# Patient Record
Sex: FEMALE | Race: WHITE | NOT HISPANIC OR LATINO
[De-identification: names, ages, dates, MRNs, and addresses within clinical notes are randomized per-mention and may not be internally consistent; named-entity substitution may affect disease eponyms.]

---

## 2017-02-13 ENCOUNTER — APPOINTMENT (OUTPATIENT)
Dept: SURGERY | Facility: CLINIC | Age: 57
End: 2017-02-13

## 2017-09-05 ENCOUNTER — OUTPATIENT (OUTPATIENT)
Dept: OUTPATIENT SERVICES | Facility: HOSPITAL | Age: 57
LOS: 1 days | End: 2017-09-05
Payer: COMMERCIAL

## 2017-09-05 ENCOUNTER — APPOINTMENT (OUTPATIENT)
Dept: ULTRASOUND IMAGING | Facility: IMAGING CENTER | Age: 57
End: 2017-09-05
Payer: COMMERCIAL

## 2017-09-05 ENCOUNTER — APPOINTMENT (OUTPATIENT)
Dept: MAMMOGRAPHY | Facility: IMAGING CENTER | Age: 57
End: 2017-09-05
Payer: COMMERCIAL

## 2017-09-05 DIAGNOSIS — Z00.00 ENCOUNTER FOR GENERAL ADULT MEDICAL EXAMINATION WITHOUT ABNORMAL FINDINGS: ICD-10-CM

## 2017-09-05 PROCEDURE — 76641 ULTRASOUND BREAST COMPLETE: CPT | Mod: 26,50

## 2017-09-05 PROCEDURE — 77063 BREAST TOMOSYNTHESIS BI: CPT | Mod: 26

## 2017-09-05 PROCEDURE — G0202: CPT | Mod: 26

## 2017-09-05 PROCEDURE — 76641 ULTRASOUND BREAST COMPLETE: CPT

## 2017-09-05 PROCEDURE — 77067 SCR MAMMO BI INCL CAD: CPT

## 2017-09-05 PROCEDURE — 77063 BREAST TOMOSYNTHESIS BI: CPT

## 2018-10-25 ENCOUNTER — APPOINTMENT (OUTPATIENT)
Dept: MAMMOGRAPHY | Facility: IMAGING CENTER | Age: 58
End: 2018-10-25
Payer: COMMERCIAL

## 2018-10-25 ENCOUNTER — APPOINTMENT (OUTPATIENT)
Dept: ULTRASOUND IMAGING | Facility: IMAGING CENTER | Age: 58
End: 2018-10-25
Payer: COMMERCIAL

## 2018-10-25 ENCOUNTER — OUTPATIENT (OUTPATIENT)
Dept: OUTPATIENT SERVICES | Facility: HOSPITAL | Age: 58
LOS: 1 days | End: 2018-10-25
Payer: COMMERCIAL

## 2018-10-25 DIAGNOSIS — Z00.8 ENCOUNTER FOR OTHER GENERAL EXAMINATION: ICD-10-CM

## 2018-10-25 PROCEDURE — 77067 SCR MAMMO BI INCL CAD: CPT

## 2018-10-25 PROCEDURE — 77063 BREAST TOMOSYNTHESIS BI: CPT | Mod: 26

## 2018-10-25 PROCEDURE — 77067 SCR MAMMO BI INCL CAD: CPT | Mod: 26

## 2018-10-25 PROCEDURE — 76641 ULTRASOUND BREAST COMPLETE: CPT | Mod: 26,50

## 2018-10-25 PROCEDURE — 77063 BREAST TOMOSYNTHESIS BI: CPT

## 2018-10-25 PROCEDURE — 76641 ULTRASOUND BREAST COMPLETE: CPT

## 2019-11-14 ENCOUNTER — APPOINTMENT (OUTPATIENT)
Dept: MAMMOGRAPHY | Facility: IMAGING CENTER | Age: 59
End: 2019-11-14
Payer: COMMERCIAL

## 2019-11-14 ENCOUNTER — APPOINTMENT (OUTPATIENT)
Dept: ULTRASOUND IMAGING | Facility: IMAGING CENTER | Age: 59
End: 2019-11-14
Payer: COMMERCIAL

## 2019-11-14 ENCOUNTER — APPOINTMENT (OUTPATIENT)
Dept: RADIOLOGY | Facility: IMAGING CENTER | Age: 59
End: 2019-11-14
Payer: COMMERCIAL

## 2019-11-14 ENCOUNTER — OUTPATIENT (OUTPATIENT)
Dept: OUTPATIENT SERVICES | Facility: HOSPITAL | Age: 59
LOS: 1 days | End: 2019-11-14
Payer: COMMERCIAL

## 2019-11-14 DIAGNOSIS — Z13.820 ENCOUNTER FOR SCREENING FOR OSTEOPOROSIS: ICD-10-CM

## 2019-11-14 DIAGNOSIS — R92.8 OTHER ABNORMAL AND INCONCLUSIVE FINDINGS ON DIAGNOSTIC IMAGING OF BREAST: ICD-10-CM

## 2019-11-14 PROCEDURE — 76641 ULTRASOUND BREAST COMPLETE: CPT

## 2019-11-14 PROCEDURE — 77080 DXA BONE DENSITY AXIAL: CPT | Mod: 26

## 2019-11-14 PROCEDURE — 76641 ULTRASOUND BREAST COMPLETE: CPT | Mod: 26,50

## 2019-11-14 PROCEDURE — G0279: CPT | Mod: 26

## 2019-11-14 PROCEDURE — 77066 DX MAMMO INCL CAD BI: CPT | Mod: 26

## 2019-11-14 PROCEDURE — 77066 DX MAMMO INCL CAD BI: CPT

## 2019-11-14 PROCEDURE — G0279: CPT

## 2019-11-14 PROCEDURE — 77080 DXA BONE DENSITY AXIAL: CPT

## 2020-03-16 ENCOUNTER — OUTPATIENT (OUTPATIENT)
Dept: OUTPATIENT SERVICES | Facility: HOSPITAL | Age: 60
LOS: 1 days | End: 2020-03-16
Payer: COMMERCIAL

## 2020-03-16 ENCOUNTER — APPOINTMENT (OUTPATIENT)
Dept: ULTRASOUND IMAGING | Facility: IMAGING CENTER | Age: 60
End: 2020-03-16
Payer: COMMERCIAL

## 2020-03-16 ENCOUNTER — APPOINTMENT (OUTPATIENT)
Dept: MAMMOGRAPHY | Facility: IMAGING CENTER | Age: 60
End: 2020-03-16
Payer: COMMERCIAL

## 2020-03-16 DIAGNOSIS — R92.8 OTHER ABNORMAL AND INCONCLUSIVE FINDINGS ON DIAGNOSTIC IMAGING OF BREAST: ICD-10-CM

## 2020-03-16 PROCEDURE — 77065 DX MAMMO INCL CAD UNI: CPT | Mod: 26,RT

## 2020-03-16 PROCEDURE — 77065 DX MAMMO INCL CAD UNI: CPT

## 2020-03-20 ENCOUNTER — APPOINTMENT (OUTPATIENT)
Dept: MAMMOGRAPHY | Facility: IMAGING CENTER | Age: 60
End: 2020-03-20
Payer: COMMERCIAL

## 2020-03-20 ENCOUNTER — RESULT REVIEW (OUTPATIENT)
Age: 60
End: 2020-03-20

## 2020-03-20 ENCOUNTER — OUTPATIENT (OUTPATIENT)
Dept: OUTPATIENT SERVICES | Facility: HOSPITAL | Age: 60
LOS: 1 days | End: 2020-03-20
Payer: COMMERCIAL

## 2020-03-20 DIAGNOSIS — Z00.8 ENCOUNTER FOR OTHER GENERAL EXAMINATION: ICD-10-CM

## 2020-03-20 PROCEDURE — 19081 BX BREAST 1ST LESION STRTCTC: CPT

## 2020-03-20 PROCEDURE — 88305 TISSUE EXAM BY PATHOLOGIST: CPT | Mod: 26

## 2020-03-20 PROCEDURE — 77065 DX MAMMO INCL CAD UNI: CPT | Mod: 26,RT

## 2020-03-20 PROCEDURE — A4648: CPT

## 2020-03-20 PROCEDURE — 88305 TISSUE EXAM BY PATHOLOGIST: CPT

## 2020-03-20 PROCEDURE — 19081 BX BREAST 1ST LESION STRTCTC: CPT | Mod: RT

## 2020-03-20 PROCEDURE — 77065 DX MAMMO INCL CAD UNI: CPT

## 2020-03-23 LAB — SURGICAL PATHOLOGY STUDY: SIGNIFICANT CHANGE UP

## 2020-08-02 ENCOUNTER — TRANSCRIPTION ENCOUNTER (OUTPATIENT)
Age: 60
End: 2020-08-02

## 2024-01-01 ENCOUNTER — INPATIENT (INPATIENT)
Facility: HOSPITAL | Age: 64
LOS: 0 days | End: 2024-10-24
Attending: EMERGENCY MEDICINE | Admitting: EMERGENCY MEDICINE
Payer: COMMERCIAL

## 2024-01-01 VITALS
SYSTOLIC BLOOD PRESSURE: 130 MMHG | OXYGEN SATURATION: 99 % | RESPIRATION RATE: 18 BRPM | HEART RATE: 113 BPM | TEMPERATURE: 97 F | DIASTOLIC BLOOD PRESSURE: 100 MMHG

## 2024-01-01 DIAGNOSIS — R41.82 ALTERED MENTAL STATUS, UNSPECIFIED: ICD-10-CM

## 2024-01-01 LAB
ADD ON TEST-SPECIMEN IN LAB: SIGNIFICANT CHANGE UP
ALBUMIN SERPL ELPH-MCNC: 2.1 G/DL — LOW (ref 3.3–5)
ALBUMIN SERPL ELPH-MCNC: 2.4 G/DL — LOW (ref 3.3–5)
ALP SERPL-CCNC: 158 U/L — HIGH (ref 40–120)
ALP SERPL-CCNC: 75 U/L — SIGNIFICANT CHANGE UP (ref 40–120)
ALT FLD-CCNC: 247 U/L — HIGH (ref 4–33)
ALT FLD-CCNC: 44 U/L — HIGH (ref 4–33)
ANION GAP SERPL CALC-SCNC: 21 MMOL/L — HIGH (ref 7–14)
ANION GAP SERPL CALC-SCNC: >29 MMOL/L — HIGH (ref 7–14)
ANISOCYTOSIS BLD QL: SLIGHT — SIGNIFICANT CHANGE UP
APPEARANCE UR: ABNORMAL
APTT BLD: 26.4 SEC — SIGNIFICANT CHANGE UP (ref 24.5–35.6)
AST SERPL-CCNC: 1032 U/L — HIGH (ref 4–32)
AST SERPL-CCNC: 135 U/L — HIGH (ref 4–32)
BACTERIA # UR AUTO: ABNORMAL /HPF
BASOPHILS # BLD AUTO: 0 K/UL — SIGNIFICANT CHANGE UP (ref 0–0.2)
BASOPHILS # BLD AUTO: 0.05 K/UL — SIGNIFICANT CHANGE UP (ref 0–0.2)
BASOPHILS NFR BLD AUTO: 0 % — SIGNIFICANT CHANGE UP (ref 0–2)
BASOPHILS NFR BLD AUTO: 0.4 % — SIGNIFICANT CHANGE UP (ref 0–2)
BILIRUB SERPL-MCNC: 1.2 MG/DL — SIGNIFICANT CHANGE UP (ref 0.2–1.2)
BILIRUB SERPL-MCNC: 1.2 MG/DL — SIGNIFICANT CHANGE UP (ref 0.2–1.2)
BILIRUB UR-MCNC: ABNORMAL
BLOOD GAS ARTERIAL COMPREHENSIVE RESULT: SIGNIFICANT CHANGE UP
BLOOD GAS VENOUS COMPREHENSIVE RESULT: SIGNIFICANT CHANGE UP
BLOOD GAS VENOUS COMPREHENSIVE RESULT: SIGNIFICANT CHANGE UP
BUN SERPL-MCNC: 70 MG/DL — HIGH (ref 7–23)
BUN SERPL-MCNC: 71 MG/DL — HIGH (ref 7–23)
BURR CELLS BLD QL SMEAR: PRESENT — SIGNIFICANT CHANGE UP
BURR CELLS BLD QL SMEAR: SLIGHT — SIGNIFICANT CHANGE UP
CALCIUM SERPL-MCNC: 7.8 MG/DL — LOW (ref 8.4–10.5)
CALCIUM SERPL-MCNC: 9 MG/DL — SIGNIFICANT CHANGE UP (ref 8.4–10.5)
CAST: 27 /LPF — HIGH (ref 0–4)
CHLORIDE SERPL-SCNC: 108 MMOL/L — HIGH (ref 98–107)
CHLORIDE SERPL-SCNC: 108 MMOL/L — HIGH (ref 98–107)
CO2 SERPL-SCNC: 14 MMOL/L — LOW (ref 22–31)
CO2 SERPL-SCNC: <7 MMOL/L — CRITICAL LOW (ref 22–31)
COLOR SPEC: SIGNIFICANT CHANGE UP
CREAT SERPL-MCNC: 2.85 MG/DL — HIGH (ref 0.5–1.3)
CREAT SERPL-MCNC: 3.03 MG/DL — HIGH (ref 0.5–1.3)
DIFF PNL FLD: ABNORMAL
EGFR: 17 ML/MIN/1.73M2 — LOW
EGFR: 18 ML/MIN/1.73M2 — LOW
EOSINOPHIL # BLD AUTO: 0 K/UL — SIGNIFICANT CHANGE UP (ref 0–0.5)
EOSINOPHIL # BLD AUTO: 0.45 K/UL — SIGNIFICANT CHANGE UP (ref 0–0.5)
EOSINOPHIL NFR BLD AUTO: 0 % — SIGNIFICANT CHANGE UP (ref 0–6)
EOSINOPHIL NFR BLD AUTO: 3.8 % — SIGNIFICANT CHANGE UP (ref 0–6)
FLUAV AG NPH QL: SIGNIFICANT CHANGE UP
FLUBV AG NPH QL: SIGNIFICANT CHANGE UP
GLUCOSE BLDC GLUCOMTR-MCNC: 154 MG/DL — HIGH (ref 70–99)
GLUCOSE SERPL-MCNC: 173 MG/DL — HIGH (ref 70–99)
GLUCOSE SERPL-MCNC: 202 MG/DL — HIGH (ref 70–99)
GLUCOSE UR QL: NEGATIVE MG/DL — SIGNIFICANT CHANGE UP
HCT VFR BLD CALC: 47.3 % — HIGH (ref 34.5–45)
HCT VFR BLD CALC: 53.9 % — HIGH (ref 34.5–45)
HGB BLD-MCNC: 14.8 G/DL — SIGNIFICANT CHANGE UP (ref 11.5–15.5)
HGB BLD-MCNC: 16.4 G/DL — HIGH (ref 11.5–15.5)
IANC: 10.03 K/UL — HIGH (ref 1.8–7.4)
IANC: 4.38 K/UL — SIGNIFICANT CHANGE UP (ref 1.8–7.4)
IMM GRANULOCYTES NFR BLD AUTO: 0.9 % — SIGNIFICANT CHANGE UP (ref 0–0.9)
INR BLD: 1.14 RATIO — SIGNIFICANT CHANGE UP (ref 0.85–1.16)
KETONES UR-MCNC: ABNORMAL MG/DL
LEUKOCYTE ESTERASE UR-ACNC: ABNORMAL
LYMPHOCYTES # BLD AUTO: 0.35 K/UL — LOW (ref 1–3.3)
LYMPHOCYTES # BLD AUTO: 0.73 K/UL — LOW (ref 1–3.3)
LYMPHOCYTES # BLD AUTO: 6.2 % — LOW (ref 13–44)
LYMPHOCYTES # BLD AUTO: 7 % — LOW (ref 13–44)
MACROCYTES BLD QL: SLIGHT — SIGNIFICANT CHANGE UP
MCHC RBC-ENTMCNC: 28 PG — SIGNIFICANT CHANGE UP (ref 27–34)
MCHC RBC-ENTMCNC: 28.5 PG — SIGNIFICANT CHANGE UP (ref 27–34)
MCHC RBC-ENTMCNC: 30.4 GM/DL — LOW (ref 32–36)
MCHC RBC-ENTMCNC: 31.3 GM/DL — LOW (ref 32–36)
MCV RBC AUTO: 91 FL — SIGNIFICANT CHANGE UP (ref 80–100)
MCV RBC AUTO: 92.1 FL — SIGNIFICANT CHANGE UP (ref 80–100)
METAMYELOCYTES # FLD: 3.5 % — HIGH (ref 0–1)
MONOCYTES # BLD AUTO: 0.31 K/UL — SIGNIFICANT CHANGE UP (ref 0–0.9)
MONOCYTES # BLD AUTO: 0.49 K/UL — SIGNIFICANT CHANGE UP (ref 0–0.9)
MONOCYTES NFR BLD AUTO: 4.1 % — SIGNIFICANT CHANGE UP (ref 2–14)
MONOCYTES NFR BLD AUTO: 6.1 % — SIGNIFICANT CHANGE UP (ref 2–14)
MYELOCYTES NFR BLD: 1.8 % — HIGH (ref 0–0)
NEUTROPHILS # BLD AUTO: 10.03 K/UL — HIGH (ref 1.8–7.4)
NEUTROPHILS # BLD AUTO: 4.14 K/UL — SIGNIFICANT CHANGE UP (ref 1.8–7.4)
NEUTROPHILS NFR BLD AUTO: 71.9 % — SIGNIFICANT CHANGE UP (ref 43–77)
NEUTROPHILS NFR BLD AUTO: 84.6 % — HIGH (ref 43–77)
NEUTS BAND # BLD: 9.7 % — HIGH (ref 0–6)
NITRITE UR-MCNC: NEGATIVE — SIGNIFICANT CHANGE UP
NRBC # BLD: 0 /100 WBCS — SIGNIFICANT CHANGE UP (ref 0–0)
NRBC # FLD: 0 K/UL — SIGNIFICANT CHANGE UP (ref 0–0)
OVALOCYTES BLD QL SMEAR: SLIGHT — SIGNIFICANT CHANGE UP
PH UR: 5 — SIGNIFICANT CHANGE UP (ref 5–8)
PLAT MORPH BLD: NORMAL — SIGNIFICANT CHANGE UP
PLATELET # BLD AUTO: 147 K/UL — LOW (ref 150–400)
PLATELET # BLD AUTO: 238 K/UL — SIGNIFICANT CHANGE UP (ref 150–400)
PLATELET COUNT - ESTIMATE: ABNORMAL
POIKILOCYTOSIS BLD QL AUTO: SIGNIFICANT CHANGE UP
POLYCHROMASIA BLD QL SMEAR: SLIGHT — SIGNIFICANT CHANGE UP
POTASSIUM SERPL-MCNC: 4 MMOL/L — SIGNIFICANT CHANGE UP (ref 3.5–5.3)
POTASSIUM SERPL-MCNC: 5.3 MMOL/L — SIGNIFICANT CHANGE UP (ref 3.5–5.3)
POTASSIUM SERPL-SCNC: 4 MMOL/L — SIGNIFICANT CHANGE UP (ref 3.5–5.3)
POTASSIUM SERPL-SCNC: 5.3 MMOL/L — SIGNIFICANT CHANGE UP (ref 3.5–5.3)
PROT SERPL-MCNC: 4.4 G/DL — LOW (ref 6–8.3)
PROT SERPL-MCNC: 5.5 G/DL — LOW (ref 6–8.3)
PROT UR-MCNC: 100 MG/DL
PROTHROM AB SERPL-ACNC: 13.6 SEC — HIGH (ref 9.9–13.4)
RBC # BLD: 5.2 M/UL — SIGNIFICANT CHANGE UP (ref 3.8–5.2)
RBC # BLD: 5.85 M/UL — HIGH (ref 3.8–5.2)
RBC # FLD: 14.1 % — SIGNIFICANT CHANGE UP (ref 10.3–14.5)
RBC # FLD: 14.1 % — SIGNIFICANT CHANGE UP (ref 10.3–14.5)
RBC BLD AUTO: ABNORMAL
RBC CASTS # UR COMP ASSIST: 10 /HPF — HIGH (ref 0–4)
REVIEW: SIGNIFICANT CHANGE UP
RSV RNA NPH QL NAA+NON-PROBE: SIGNIFICANT CHANGE UP
SARS-COV-2 RNA SPEC QL NAA+PROBE: SIGNIFICANT CHANGE UP
SMUDGE CELLS # BLD: PRESENT — SIGNIFICANT CHANGE UP
SODIUM SERPL-SCNC: 143 MMOL/L — SIGNIFICANT CHANGE UP (ref 135–145)
SODIUM SERPL-SCNC: 144 MMOL/L — SIGNIFICANT CHANGE UP (ref 135–145)
SP GR SPEC: 1.02 — SIGNIFICANT CHANGE UP (ref 1–1.03)
SQUAMOUS # UR AUTO: 57 /HPF — HIGH (ref 0–5)
TROPONIN T, HIGH SENSITIVITY RESULT: 71 NG/L — CRITICAL HIGH
TROPONIN T, HIGH SENSITIVITY RESULT: 89 NG/L — CRITICAL HIGH
UROBILINOGEN FLD QL: 1 MG/DL — SIGNIFICANT CHANGE UP (ref 0.2–1)
WBC # BLD: 11.86 K/UL — HIGH (ref 3.8–10.5)
WBC # BLD: 5.07 K/UL — SIGNIFICANT CHANGE UP (ref 3.8–10.5)
WBC # FLD AUTO: 11.86 K/UL — HIGH (ref 3.8–10.5)
WBC # FLD AUTO: 5.07 K/UL — SIGNIFICANT CHANGE UP (ref 3.8–10.5)
WBC UR QL: 5 /HPF — SIGNIFICANT CHANGE UP (ref 0–5)

## 2024-01-01 PROCEDURE — 99292 CRITICAL CARE ADDL 30 MIN: CPT | Mod: GC

## 2024-01-01 PROCEDURE — 74176 CT ABD & PELVIS W/O CONTRAST: CPT | Mod: 26,MC

## 2024-01-01 PROCEDURE — 70450 CT HEAD/BRAIN W/O DYE: CPT | Mod: 26,MC

## 2024-01-01 PROCEDURE — 99285 EMERGENCY DEPT VISIT HI MDM: CPT

## 2024-01-01 PROCEDURE — 71045 X-RAY EXAM CHEST 1 VIEW: CPT | Mod: 26

## 2024-01-01 PROCEDURE — 99291 CRITICAL CARE FIRST HOUR: CPT | Mod: GC

## 2024-01-01 RX ORDER — PIPERACILLIN AND TAZOBACTAM .5; 4 G/20ML; G/20ML
3.38 INJECTION, POWDER, LYOPHILIZED, FOR SOLUTION INTRAVENOUS EVERY 12 HOURS
Refills: 0 | Status: DISCONTINUED | OUTPATIENT
Start: 2024-01-01 | End: 2024-01-01

## 2024-01-01 RX ORDER — CHLORHEXIDINE GLUCONATE 40 MG/ML
15 SOLUTION TOPICAL EVERY 12 HOURS
Refills: 0 | Status: DISCONTINUED | OUTPATIENT
Start: 2024-01-01 | End: 2024-01-01

## 2024-01-01 RX ORDER — ACETAMINOPHEN 500 MG
1000 TABLET ORAL ONCE
Refills: 0 | Status: COMPLETED | OUTPATIENT
Start: 2024-01-01 | End: 2024-01-01

## 2024-01-01 RX ORDER — VANCOMYCIN HYDROCHLORIDE 50 MG/ML
1000 KIT ORAL ONCE
Refills: 0 | Status: DISCONTINUED | OUTPATIENT
Start: 2024-01-01 | End: 2024-01-01

## 2024-01-01 RX ORDER — LORAZEPAM 2 MG
0.5 TABLET ORAL EVERY 4 HOURS
Refills: 0 | Status: DISCONTINUED | OUTPATIENT
Start: 2024-01-01 | End: 2024-01-01

## 2024-01-01 RX ORDER — CHLORHEXIDINE GLUCONATE 40 MG/ML
1 SOLUTION TOPICAL
Refills: 0 | Status: DISCONTINUED | OUTPATIENT
Start: 2024-01-01 | End: 2024-01-01

## 2024-01-01 RX ORDER — PROPOFOL 10 MG/ML
15 INJECTION, EMULSION INTRAVENOUS
Qty: 1000 | Refills: 0 | Status: DISCONTINUED | OUTPATIENT
Start: 2024-01-01 | End: 2024-01-01

## 2024-01-01 RX ORDER — HYDROMORPHONE HCL/0.9% NACL/PF 6 MG/30 ML
0.5 PATIENT CONTROLLED ANALGESIA SYRINGE INTRAVENOUS
Refills: 0 | Status: DISCONTINUED | OUTPATIENT
Start: 2024-01-01 | End: 2024-01-01

## 2024-01-01 RX ORDER — ROCURONIUM BROMIDE 50 MG/5 ML
70 SYRINGE (ML) INTRAVENOUS ONCE
Refills: 0 | Status: COMPLETED | OUTPATIENT
Start: 2024-01-01 | End: 2024-01-01

## 2024-01-01 RX ORDER — NOREPINEPHRINE BITARTRATE 1 MG/ML
0.05 INJECTION, SOLUTION, CONCENTRATE INTRAVENOUS
Qty: 8 | Refills: 0 | Status: DISCONTINUED | OUTPATIENT
Start: 2024-01-01 | End: 2024-01-01

## 2024-01-01 RX ORDER — ETOMIDATE 2 MG/ML
20 INJECTION, SOLUTION INTRAVENOUS ONCE
Refills: 0 | Status: COMPLETED | OUTPATIENT
Start: 2024-01-01 | End: 2024-01-01

## 2024-01-01 RX ORDER — VANCOMYCIN HYDROCHLORIDE 50 MG/ML
1000 KIT ORAL ONCE
Refills: 0 | Status: COMPLETED | OUTPATIENT
Start: 2024-01-01 | End: 2024-01-01

## 2024-01-01 RX ORDER — SODIUM BICARBONATE 1 MEQ/ML
50 VIAL (ML) INTRAVENOUS ONCE
Refills: 0 | Status: COMPLETED | OUTPATIENT
Start: 2024-01-01 | End: 2024-01-01

## 2024-01-01 RX ORDER — PHENYLEPHRINE HCL IN 0.9% NACL 20MG/250ML
300 PLASTIC BAG, INJECTION (ML) INTRAVENOUS ONCE
Refills: 0 | Status: COMPLETED | OUTPATIENT
Start: 2024-01-01 | End: 2024-01-01

## 2024-01-01 RX ORDER — PIPERACILLIN AND TAZOBACTAM .5; 4 G/20ML; G/20ML
3.38 INJECTION, POWDER, LYOPHILIZED, FOR SOLUTION INTRAVENOUS ONCE
Refills: 0 | Status: COMPLETED | OUTPATIENT
Start: 2024-01-01 | End: 2024-01-01

## 2024-01-01 RX ORDER — NOREPINEPHRINE BITARTRATE 1 MG/ML
0.1 INJECTION, SOLUTION, CONCENTRATE INTRAVENOUS
Qty: 8 | Refills: 0 | Status: DISCONTINUED | OUTPATIENT
Start: 2024-01-01 | End: 2024-01-01

## 2024-01-01 RX ORDER — SODIUM CHLORIDE 9 MG/ML
1000 INJECTION, SOLUTION INTRAMUSCULAR; INTRAVENOUS; SUBCUTANEOUS ONCE
Refills: 0 | Status: DISCONTINUED | OUTPATIENT
Start: 2024-01-01 | End: 2024-01-01

## 2024-01-01 RX ORDER — SODIUM CHLORIDE 9 MG/ML
1000 INJECTION, SOLUTION INTRAMUSCULAR; INTRAVENOUS; SUBCUTANEOUS ONCE
Refills: 0 | Status: COMPLETED | OUTPATIENT
Start: 2024-01-01 | End: 2024-01-01

## 2024-01-01 RX ADMIN — Medication 1000 MILLIGRAM(S): at 13:21

## 2024-01-01 RX ADMIN — Medication 1000 MILLILITER(S): at 21:45

## 2024-01-01 RX ADMIN — Medication 300 MICROGRAM(S): at 16:30

## 2024-01-01 RX ADMIN — PIPERACILLIN AND TAZOBACTAM 25 GRAM(S): .5; 4 INJECTION, POWDER, LYOPHILIZED, FOR SOLUTION INTRAVENOUS at 23:02

## 2024-01-01 RX ADMIN — Medication 1000 MILLILITER(S): at 16:30

## 2024-01-01 RX ADMIN — Medication 70 MILLIGRAM(S): at 16:30

## 2024-01-01 RX ADMIN — Medication 1000 MILLILITER(S): at 17:20

## 2024-01-01 RX ADMIN — PROPOFOL 5.4 MICROGRAM(S)/KG/MIN: 10 INJECTION, EMULSION INTRAVENOUS at 17:00

## 2024-01-01 RX ADMIN — Medication 1000 MILLILITER(S): at 19:00

## 2024-01-01 RX ADMIN — Medication 1000 MILLIGRAM(S): at 14:00

## 2024-01-01 RX ADMIN — Medication 25 GRAM(S): at 16:30

## 2024-01-01 RX ADMIN — PIPERACILLIN AND TAZOBACTAM 200 GRAM(S): .5; 4 INJECTION, POWDER, LYOPHILIZED, FOR SOLUTION INTRAVENOUS at 13:06

## 2024-01-01 RX ADMIN — CHLORHEXIDINE GLUCONATE 15 MILLILITER(S): 40 SOLUTION TOPICAL at 23:02

## 2024-01-01 RX ADMIN — Medication 25 GRAM(S): at 18:05

## 2024-01-01 RX ADMIN — NOREPINEPHRINE BITARTRATE 11.3 MICROGRAM(S)/KG/MIN: 1 INJECTION, SOLUTION, CONCENTRATE INTRAVENOUS at 17:05

## 2024-01-01 RX ADMIN — Medication 2000 MILLILITER(S): at 18:52

## 2024-01-01 RX ADMIN — SODIUM CHLORIDE 1000 MILLILITER(S): 9 INJECTION, SOLUTION INTRAMUSCULAR; INTRAVENOUS; SUBCUTANEOUS at 12:48

## 2024-01-01 RX ADMIN — VANCOMYCIN HYDROCHLORIDE 250 MILLIGRAM(S): KIT at 18:24

## 2024-01-01 RX ADMIN — Medication 400 MILLIGRAM(S): at 13:04

## 2024-01-01 RX ADMIN — PIPERACILLIN AND TAZOBACTAM 3.38 GRAM(S): .5; 4 INJECTION, POWDER, LYOPHILIZED, FOR SOLUTION INTRAVENOUS at 16:15

## 2024-01-01 RX ADMIN — ETOMIDATE 20 MILLIGRAM(S): 2 INJECTION, SOLUTION INTRAVENOUS at 16:30

## 2024-01-01 RX ADMIN — Medication 50 MILLIEQUIVALENT(S): at 00:45

## 2024-01-01 RX ADMIN — SODIUM CHLORIDE 1000 MILLILITER(S): 9 INJECTION, SOLUTION INTRAMUSCULAR; INTRAVENOUS; SUBCUTANEOUS at 13:00

## 2024-10-23 NOTE — ED ADULT NURSE REASSESSMENT NOTE - NS ED NURSE REASSESS COMMENT FT1
Mobile Critical Care RN: Pt sedated on Propofol. 7.5 ETT 25 lip. AC 20/400/5/100. Unable to obtain O2 sat- MD West aware. ABG sent. No secretions. Afebrile. Sinus tach on cardiac monitor. Levophed gtt titrated as tolerated- see block charting on flowsheet for details. Mobile Critical Care RN: Pt sedated on Propofol. 7.5 ETT 25 lip. AC 20/400/5/100. Unable to obtain O2 sat- MD Wets aware. ABG sent. No secretions. Afebrile. Sinus tach on cardiac monitor. Levophed gtt titrated as tolerated- see block charting on flowsheet for details. +1 radial and DP pulses. NGT in place to intermittent suction by MD West w/ 50cc brown output. 14F June placed as ordered- No urine output- MD Ford aware. No new orders in place. Mobile Critical Care RN: Pt sedated on Propofol. 7.5 ETT 23 lip. AC 20/400/5/100. Unable to obtain O2 sat- MD West aware. ABG sent. No secretions. Afebrile. Sinus tach on cardiac monitor. Levophed gtt titrated as tolerated- see block charting on flowsheet for details. +1 radial and DP pulses. NGT in place to intermittent suction by MD West w/ 50cc brown output. 14F June placed as ordered- No urine output- MD Ford aware. No new orders in place.

## 2024-10-23 NOTE — PATIENT PROFILE ADULT - NSPROPOAPRESSUREINJURY_GEN_A_NUR
4/29 - pt is going to go to lab - she needed results by her appt on Monday - unable to accomodate
no

## 2024-10-23 NOTE — PATIENT PROFILE ADULT - CAREGIVER NAME
Subjective:     Encounter Date:07/08/2020      Patient ID: Monica Perea is a 74 y.o. female.    Chief Complaint: Palpitations  HPI  This is a 74-year-old female patient with typical tachycardia-bradycardia syndrome.  The patient has paroxysmal atrial fibrillation.  She was also noted to have significant bradycardia limiting the use of antiarrhythmic drug therapy and AV node blocking medication.  The patient underwent a dual-chamber rate responsive pacemaker in April of this year.  She indicates she has had no improvement in her symptoms as she continues to have palpitations with a sense that her heart is racing.  The patient has bought 5 different over-the-counter heart monitoring devices which she has consistently misinterpreted as showing recurrent atrial fibrillation.  The patient indicates that she has multiple episodes of palpitations where she feels that her heart rate is greater than 100 bpm multiple times throughout the day and indicates that her over-the-counter devices indicates she is in atrial fibrillation each time.  The patient wore a 30-day outpatient cardiac monitor with over 75 symptomatic activations all of which were normal sinus rhythm with no evidence of atrial fibrillation or other arrhythmia.  The patient had rare episodes of sinus tachycardia.  Surprisingly, none of these were correlated to symptoms.  The patient is currently participating in home monitoring.  Since April the patient has had a 1% atrial fibrillation burden which was confined to the first month after pacemaker implantation.  On follow-up visit with her implanting electrophysiologist in [facility], the patient's flecainide therapy was changed to propafenone.  Since starting propafenone the patient has had no episodes of atrial fibrillation.  The patient indicates that she is having increasing difficulty paying for the rhythmol.  The patient is also therapeutically anticoagulated with Xarelto.  She has had  "no signs or symptoms to suggest stroke, TIA or other cardioembolic event.  She has had no bleeding complications related antiarrhythmic drug therapy.  The patient indicates that she is extremely fatigued and tired with no effort tolerance.  She reports severe shortness of breath with any physical activity.  She indicates that this is much worse within an hour or 2 of taking her atenolol.  Her atenolol dose has been decreased to 12.5 mg orally once per day without improvement in her fatigue or lack of energy.  The patient indicates that she had the same symptoms with Coreg, long-acting Toprol-XL and short acting low-dose Lopressor.  The patient reports ongoing chest discomfort despite a recent normal nuclear stress test.  In addition the patient has had a normal echocardiogram.  While in the exam room the patient reports that she was becoming extremely anxious and nervous and reported having chest tightness, shortness of breath and palpitations.  She immediately reached for 1 of her 5 heart monitoring devices and inform me that she was back in atrial fibrillation.  On arrival the patient had a twelve-lead electrocardiogram which showed an atrial paced rhythm with normal AV conduction and no other abnormalities.  The patient's pulse was regular with a heart rate of 60 bpm.  Auscultation of her heart demonstrated no irregularity.  A twelve-lead electrocardiogram was repeated which again showed an atrial paced rhythm at 60 bpm.  The patient indicates that she has been extremely anxious and nervous related to financial difficulties as well as other psychosocial issues related to property.  She feels that her symptoms are worse when she is experiencing \"nervousness\".  The following portions of the patient's history were reviewed and updated as appropriate: allergies, current medications, past family history, past medical history, past social history, past surgical history and problem  Review of Systems   Constitution: " Positive for malaise/fatigue. Negative for chills, diaphoresis, fever, weight gain and weight loss.   HENT: Negative for ear discharge, hearing loss, hoarse voice and nosebleeds.    Eyes: Negative for discharge, double vision, pain and photophobia.   Cardiovascular: Positive for chest pain, irregular heartbeat and palpitations. Negative for claudication, cyanosis, dyspnea on exertion, leg swelling, near-syncope, orthopnea, paroxysmal nocturnal dyspnea and syncope.   Respiratory: Positive for shortness of breath. Negative for cough, hemoptysis, sputum production and wheezing.    Endocrine: Negative for cold intolerance, heat intolerance, polydipsia, polyphagia and polyuria.   Hematologic/Lymphatic: Negative for adenopathy and bleeding problem. Does not bruise/bleed easily.   Skin: Negative for color change, flushing, itching and rash.   Musculoskeletal: Negative for muscle cramps, muscle weakness, myalgias and stiffness.   Gastrointestinal: Negative for abdominal pain, diarrhea, hematemesis, hematochezia, nausea and vomiting.   Genitourinary: Negative for dysuria, frequency and nocturia.   Neurological: Positive for dizziness and light-headedness. Negative for focal weakness, loss of balance, numbness, paresthesias and seizures.   Psychiatric/Behavioral: Negative for altered mental status, hallucinations and suicidal ideas.   Allergic/Immunologic: Negative for HIV exposure, hives and persistent infections.           Current Outpatient Medications:   •  amLODIPine (NORVASC) 5 MG tablet, Take 1 tablet by mouth Daily for 360 days., Disp: 30 tablet, Rfl: 11  •  Calcium Carbonate-Vitamin D (CALCIUM PLUS VITAMIN D PO), Take  by mouth Daily., Disp: , Rfl:   •  clobetasol (TEMOVATE) 0.05 % ointment, Apply  topically to the appropriate area as directed Every 12 (Twelve) Hours., Disp: 60 g, Rfl: 3  •  coenzyme Q10 100 MG capsule, Take 100 mg by mouth Daily., Disp: , Rfl:   •  hydroCHLOROthiazide (HYDRODIURIL) 25 MG tablet,  Take 1 tablet by mouth Daily., Disp: 30 tablet, Rfl: 11  •  melatonin 5 MG tablet tablet, 5 mg Every Night., Disp: , Rfl:   •  Omega-3 Fatty Acids (FISH OIL) 1000 MG capsule capsule, Take 1,200 mg by mouth 2 (Two) Times a Day With Meals., Disp: , Rfl:   •  pravastatin (PRAVACHOL) 40 MG tablet, Take 1 tablet by mouth Daily. as directed, Disp: 90 tablet, Rfl: 3  •  propafenone SR (Rythmol SR) 325 MG 12 hr capsule, Take 1 capsule by mouth 2 (Two) Times a Day for 60 days., Disp: 60 capsule, Rfl: 1  •  rivaroxaban (XARELTO) 20 MG tablet, Take 1 tablet by mouth Daily., Disp: 30 tablet, Rfl: 5  •  TRAVATAN Z 0.004 % solution ophthalmic solution, INSTILL 1 DROP INTO EACH EYE NIGHTLY AS DIRECTED, Disp: , Rfl: 6  •  dilTIAZem CD (CARDIZEM CD) 240 MG 24 hr capsule, Take 1 capsule by mouth Daily., Disp: 30 capsule, Rfl: 11    Objective:   Physical Exam   Constitutional: She is oriented to person, place, and time. She appears well-developed and well-nourished. No distress.   HENT:   Head: Normocephalic and atraumatic.   Mouth/Throat: Oropharynx is clear and moist.   Eyes: Pupils are equal, round, and reactive to light. Conjunctivae and EOM are normal. No scleral icterus.   Neck: Normal range of motion. Neck supple. No JVD present. No tracheal deviation present. No thyromegaly present.   Cardiovascular: Normal rate, regular rhythm, S1 normal, S2 normal, normal heart sounds, intact distal pulses and normal pulses. PMI is not displaced. Exam reveals no gallop and no friction rub.   No murmur heard.  Pulmonary/Chest: Effort normal and breath sounds normal. No stridor. No respiratory distress. She has no wheezes. She has no rales.   Abdominal: Soft. Bowel sounds are normal. She exhibits no distension and no mass. There is no tenderness. There is no rebound and no guarding.   Musculoskeletal: Normal range of motion. She exhibits no edema or deformity.   Neurological: She is alert and oriented to person, place, and time. She displays  "normal reflexes. No cranial nerve deficit. Coordination normal.   Skin: Skin is warm and dry. No rash noted. She is not diaphoretic. No erythema.   Psychiatric: She has a normal mood and affect. Her behavior is normal. Thought content normal.     Blood pressure 120/60, pulse 71, height 154.9 cm (60.98\"), weight 74.8 kg (165 lb), SpO2 97 %, not currently breastfeeding.   Lab Review:     Assessment:       1. Coronary artery disease involving native coronary artery of native heart without angina pectoris  Stable and angina free.  Recent vasodilator nuclear stress test shows no inducible ischemia.  The patient's chest discomfort is highly suspected to be due to severe generalized anxiety disorder.    2. Essential hypertension  Excellent blood pressure control.    3. Paroxysmal atrial fibrillation (CMS/HCC)  The patient has had no atrial fibrillation since changing flecainide therapy to Rhythmol.  The patient has had a 30-day cardiac monitor with 75 symptomatic activations showing normal sinus rhythm with no evidence of recurrent atrial fibrillation or other atrial arrhythmias.  There was no PACs or PVCs correlating with her symptoms.  The patient has rare episodes of sinus tachycardia which are asymptomatic.    4. Sinus node dysfunction (CMS/HCC)  Normal pacemaker function.      ECG 12 Lead  Date/Time: 7/8/2020 12:31 PM  Performed by: Tico Charles MD  Authorized by: Tico Charles MD   Comparison: compared with previous ECG   Similar to previous ECG  Rhythm: paced  Rate: normal  QRS axis: normal    Clinical impression: abnormal EKG  Comments: Atrial paced at 60 bpm with normal AV conduction.            Plan:     The patient's chest discomfort, shortness of breath and palpitations are all clearly related to anxiety.  The patient is clearly getting misinformation from her 5 different over-the-counter cardiac monitoring devices which she has purchased.  The patient has been educated and reassured that her devices " are either malfunctioning or giving her erroneous information as there is no evidence of atrial fibrillation.  The patient is extremely anxious and nervous.  She has a history of generalized anxiety disorder with prior benzodiazepine abuse.  I will be contacting her primary care provider later today to discuss strategies for generalized anxiety disorder treatment versus having the patient seen by mental health specialist.  At this point there is no question in my mind that her symptoms are the result of generalized anxiety disorder.  I have recommended that the patient continue Rythmol as it has been proven to be effective in completely eliminating her atrial fibrillation as confirmed by pacemaker histogram interrogation and is clearly more efficacious than flecainide.  Despite the increased cost of this medication, it is clearly the most effective therapy she is tried to date.  The patient is having typical beta-blocker side effects which have been consistently present with use of short acting metoprolol tartrate, long-acting metoprolol succinate Coreg and atenolol.  She has been unable to tolerate even low doses of atenolol 12.5 mg once per day despite the fact that this medication has the lowest incidence of CNS side effects as it does not cross the blood-brain barrier.  For this reason I have recommended discontinuation of all beta-blocker therapy with initiation of long-acting Cardizem  mg/day.  The patient has been advised that she may develop mild constipation with this medication and is instructed to use over-the-counter Metamucil, MiraLAX, Colace and/or senna as needed.  The patient has a scheduled follow-up with her electrophysiologist in [facility].  At this point I would not advocate pulmonary vein isolation radiofrequency ablation as there is absolutely no evidence she has had atrial fibrillation on current antiarrhythmic drug therapy.  75 minutes have been spent with the patient the  entire time focused on face-to-face counseling and education as well as review of medical records.       Jorge Huber

## 2024-10-23 NOTE — CHART NOTE - NSCHARTNOTEFT_GEN_A_CORE
Patient brought up from ED on levophed 0.8mcg/kg/min and propofol 15mcg/kg/min. Noted by RN to have L arm shaking and propofol increased to 25mcg/kg/min, which caused shaking to stop. BP dropped and levophed uptitrated to 1mcg/kg/min. Had discussion with family at bedside about patient's poor prognosis given the fact that she is not a surgical candidate and was not tolerating high doses of pressors with her tachyarrhythmias. Informed family that the next step would be to place a central line and A-line due to increasing pressor requirements and family stated that they did not want those interventions and that they wanted the patient to be comfortable. Per family, priority is patient's comfort and they did not want further lab draws. Levophed capped at 1mcg/kg/min and propofol to remain at 25mcg/kg/min. MD Brush aware. Family present at bedside and emotional support provided. Patient brought up from ED on levophed 0.8mcg/kg/min and propofol 15mcg/kg/min. Noted by RN to have L arm shaking and propofol increased to 25mcg/kg/min, which caused shaking to stop. BP dropped and levophed uptitrated to 1mcg/kg/min. Had discussion with family at bedside about patient's poor prognosis given the fact that she is not a surgical candidate and was not tolerating high doses of pressors with her tachyarrhythmias. Informed family that the next step would be to place a central line and A-line due to increasing pressor requirements and family stated that they did not want those interventions and that they wanted the patient to be comfortable. Per family, priority is patient's comfort and they did not want further lab draws. Levophed capped at 1mcg/kg/min and propofol to remain at 25mcg/kg/min. PRN medications added for patient's comfort. MD Brush aware. Family present at bedside and called  to come for last rites. Emotional support provided.

## 2024-10-23 NOTE — H&P ADULT - ASSESSMENT
ASSESSMENT AND PLAN:  Admit Date: 10/23  VISHNU SUMMERS is a 64y Female with hx of Alzheimer (baseline ambulatory though AOx0) and HTN presented with AMS with decrease PO intake due to persistent NBNB vomiting for 5 days. CT A/P find to have closed looped SBO, with concern for ischemic bowel and possible hemoperitoneum.    NEURO:  Baseline: mental status AOx0  Sedation: Propofol RASS -1 to 0  # Encephalopathic, letharic  # Alzheimer Disease  - likely iso of toxic metabolic encephalopathy iso hypovolemic, hypoglycemia, severe lactic acidosis, uremia other ddx include vascular insult, infection  - management as below       CARDIOVASCULAR:  #HTN  - on home?    #troponinemia  - likely iso of JACKIE      HEMODYNAMICS:  #Shock  Pressor: Levophed 0.1  MAP goals: > 65  - likely hypovolemic and distributive iso of ischemic bowel   - lactic 13->9  - s/p 2L fluid  - additional 2L fluid  - monitor for abdominal compartment syndrome  - concern for adding additional pressor, vaso/phenyl, given potential for vascular compromise w/ pre-existing closed loop SBO      RESPIRATORY:  Vent setting Volume /20/100/5  Secretion  Potential for extubation:   (minimum vent setting, minimal secretion, breathing on own)  - intubated for airway protection      GI/NUTRITION:  #closed loop SBO  #ischemic bowel  - CT A/P non-cont showed closed loop SBO, concerning for ischemic bowel, possible hemoperitoneum given moderate volume ascites  - surgery consulted at the ED  - family preferred no surgical intervention  - monitor for abdominal compartment syndrome     GI PPX:   Diet: NPO  - D5/LR mIVGF      RENAL:  #Anuric JACKIE  I/O:  - Cr 3.3 -> 2.85  - likely 2/2 to hypovolemic-ATN  - bladder scan q8  - renal consult, likely need CRRT, pending GOC, as pt not ideal candidate given guarded prognosis      ID:  #ischemic bowel  Plan  - Antibiotics: zosyn (10/23-)  - 10/23 f/u blood clx  - likely need resection for source control    #bacteruria  - likely contaminant given epithelial cells      HEME:  no acute concerns    - Maintain active type and screen      ENDOCRINE:  - FS q6      DVT PPX: SCD   Lines: PIV  Navarro: indwelling navarro      ETHICS:  Code Status: DNR/intubation  Health Care Proxy:

## 2024-10-23 NOTE — CONSULT NOTE ADULT - ATTENDING COMMENTS
I have reviewed the history, pertinent labs and imaging, and discussed the care with the consult resident.  More than 50% of this 80 minute encounter including face to face with the patient and her  was spent counseling and/or coordination of care on septic shock presumed due to ischemic bowel.  Time included review of vitals, labs, imaging, discussion with consultants and coordination with the emergency department.  Patient examined at 4:40pm.    Chief complaint:  vomiting    ICU Vital Signs Last 24 Hrs  T(C): 37.4 (23 Oct 2024 14:00), Max: 37.9 (23 Oct 2024 12:52)  T(F): 99.3 (23 Oct 2024 14:00), Max: 100.3 (23 Oct 2024 12:52)  HR: 130 (23 Oct 2024 17:45) (100 - 130)  BP: 60/32 (23 Oct 2024 17:00) (60/32 - 130/100)  BP(mean): 41 (23 Oct 2024 17:00) (41 - 88)  RR: 20 (23 Oct 2024 17:45) (18 - 40)  SpO2: 96% (23 Oct 2024 16:20) (96% - 100%)    O2 Parameters below as of 23 Oct 2024 17:45  Patient On (Oxygen Delivery Method): ventilator    O2 Concentration (%): 100    intubated but unresponsive just prior to intubation by ED doc  coarse mechanical BS bilat  tachy  abd distended, well healed lower transverse surgical scar, no obvious hernias, no erythema of abd wall    ABG - ( 23 Oct 2024 17:10 )  pH, Arterial: 7.20  pH, Blood: x     /  pCO2: 26    /  pO2: 260   / HCO3: 10    / Base Excess: -16.2 /  SaO2: 100.0     lactate elevated    non-con CT scan reviewed    post-intubation CXR reviewed    MEDICATIONS  (STANDING):  chlorhexidine 0.12% Liquid 15 milliLiter(s) Oral Mucosa every 12 hours  dextrose 50% Injectable 25 Gram(s) IV Push Once  dextrose 50% Injectable 25 Gram(s) IV Push Once  etomidate Injectable 20 milliGRAM(s) IV Push once  lactated ringers Bolus 1000 milliLiter(s) IV Bolus once  lactated ringers. 1000 milliLiter(s) (250 mL/Hr) IV Continuous <Continuous>  lactated ringers. 2000 milliLiter(s) (2000 mL/Hr) IV Continuous <Continuous>  norepinephrine Infusion 0.1 MICROgram(s)/kG/Min (11.3 mL/Hr) IV Continuous <Continuous>  phenylephrine   100 mCg/mL NaCl 0.9% Injectable 300 MICROGram(s) IV Push Once  propofol Infusion 15 MICROgram(s)/kG/Min (5.4 mL/Hr) IV Continuous <Continuous>  rocuronium Injectable 70 milliGRAM(s) IV Push Once    The active issues are:  1.  presumed septic/hypovolemic shock due to vomiting-dehydration and possibly ischemic bowel  2.  metabolic acidosis  3. acutely altered mental status - toxic/metabolic encephalopathy  4. advanced dementia with poor functional status    The patient may have adhesive closed loop SBO but it is difficult to discern on non-con CT scan.  Her severe acidosis and metabolic/hemodynamic instability preclude safe anesthesia.  I explained this to her .  After family discussion, they have opted to DNR.  Please re-consult general surgery if needed.    The Acute Care Surgery (B Team) Practice:    urgent issues - pager [phone]  nonurgent issues - [phone]  patient appointments or afterhours - [phone]

## 2024-10-23 NOTE — H&P ADULT - ATTENDING COMMENTS
64F history advanced Alzheimer's dementia and hypertension brought in for hypoglycemia, vomiting and anorexia over the past few days.  Found to have severe metabolic derangements consistent with sepsis and CT imaging suggesting closed-loop bowel obstruction.  Developed acute hypoxemic respiratory failure and was intubated, deemed not a candidate for surgery.  MICU consulted for septic shock and respiratory failure.    On exam patient is profoundly hypotensive SBP 60s/30s, tachycardic 130s, orally intubated on mechanical ventilation, breath sounds are grossly clear, mottled extremities cool distally, abdomen is moderately distended    Review of laboratory studies shows leukocytosis with anion gap metabolic acidosis and renal injury, elevated troponin probably demand and uptrending LFTs    64F in septic shock secondary to closed-loop small bowel obstruction and acute hypoxic respiratory failure  Start fentanyl for analgesia, continue propofol for sedation  Shock secondary to severe sepsis from bowel obstruction likely causing ischemic gut  Continue norepinephrine and aggressive fluid resuscitation, patient is volume deplete on bedside ultrasound with flat IVC despite positive pressure, evidence of hyperdynamic cardiac function  MAP goal greater than 65  Uptrending troponins likely demand, obtain EKG, continue trending troponins and repeat echo if pressor requirements escalate  Currently on full vent support given severe metabolic demand and altered mentation  Keep n.p.o. with NG tube to suction  GI prophylaxis with pantoprazole  Elevated transaminases likely in setting of acute ischemic hepatitis  Lactic acidosis secondary to gut ischemia  Elevated anion gap acidosis secondary to above  JACKIE likely in setting of under resuscitation and severe sepsis, place June unlikely to benefit from RRT  Hemoglobin goal greater than 7, no indication for transfusion at this time  DVT prophylaxis SCDs enoxaparin  Acute intra-abdominal peritonitis, continue primary coverage with piperacillin/tazobactam  Start stress dose steroids hydrocortisone  Obtain blood cultures  DNR, continue goals of care discussion with family, despite surgery being definitive, patient is a poor surgical candidate and expected high perioperative mortality  Dispo SANTINO Brush MD  Critical Care Medicine

## 2024-10-23 NOTE — ED PROVIDER NOTE - PROGRESS NOTE DETAILS
Bisi - Repeat VBG reviewed.  Improved with ongoing treatment.  As likely abd source for infection, zosyn ordered.  Noted elevated creatinine, so will change CTAP to noncon to avoid further renal damage. JOSUE Rogers: Pt on cardiac monitor, BP noted to be 79/40 . Team at bedside. Pressors immediately started. Pt intubated for airway protection. Surgery team at bedside, no acute surgical intervention at this time for closed loop SBO. MICU consulted.

## 2024-10-23 NOTE — ED ADULT TRIAGE NOTE - CHIEF COMPLAINT QUOTE
pt arrived via EMS with NRB in placed , pt was unresponsive in the field  fs read low. , pt was given  1 mg glucagon in field repeat  was 54. pt received d10  and ns second finger stick was 195. pt more awake per EMS . pt with alzheimer's.  Pt bought directly to room 23.  Per EMS left pupil dilated. Pt with IO placed in filed by EMS to left tibia. unable to get weight and height

## 2024-10-23 NOTE — H&P ADULT - NSHPLABSRESULTS_GEN_ALL_CORE
LABS:                          14.8   5.07  )-----------( 147      ( 23 Oct 2024 17:30 )             47.3     10-23    143  |  108[H]  |  71[H]  ----------------------------<  173[H]  4.0   |  14[L]  |  2.85[H]    Ca    7.8[L]      23 Oct 2024 17:30    TPro  4.4[L]  /  Alb  2.1[L]  /  TBili  1.2  /  DBili  x   /  AST  1032[H]  /  ALT  247[H]  /  AlkPhos  158[H]  10-23    LIVER FUNCTIONS - ( 23 Oct 2024 17:30 )  Alb: 2.1 g/dL / Pro: 4.4 g/dL / ALK PHOS: 158 U/L / ALT: 247 U/L / AST: 1032 U/L / GGT: x           PT/INR - ( 23 Oct 2024 12:20 )   PT: 13.6 sec;   INR: 1.14 ratio         PTT - ( 23 Oct 2024 12:20 )  PTT:26.4 sec  Urinalysis Basic - ( 23 Oct 2024 17:30 )    Color: x / Appearance: x / SG: x / pH: x  Gluc: 173 mg/dL / Ketone: x  / Bili: x / Urobili: x   Blood: x / Protein: x / Nitrite: x   Leuk Esterase: x / RBC: x / WBC x   Sq Epi: x / Non Sq Epi: x / Bacteria: x

## 2024-10-23 NOTE — ED PROVIDER NOTE - CLINICAL SUMMARY MEDICAL DECISION MAKING FREE TEXT BOX
Patient resting on gurney with eyes closed, respirations even and unlabored, no outward signs of distress or discomfort noted. Parent remains at bedside, sitter remains outside patient room. Will continue to assess.      64 year old female with PMH of Alzheimer disease and HTN presents to the ED accompanied by EMS for evaluation of altered mental status and hypoglycemia. VS reviewed, , Temp 100.3F, pt is on a non-rebreather O2 sat 100%, no retractions or use of accessory muscles, abdomen distended and rigid. Imp: Altered mental status/Hypoglycemia/acute abdomen, suspect an acute intraabdominal pathology and infectious source. Plan for labs, CT head, CT A/P, IV abx, antipyretic, IV hydration, reassess.

## 2024-10-23 NOTE — ED PROVIDER NOTE - IV ALTEPLASE EXCL ABS HIDDEN
show Complex Repair And Dorsal Nasal Flap Text: The defect edges were debeveled with a #15 scalpel blade.  The primary defect was closed partially with a complex linear closure.  Given the location of the remaining defect, shape of the defect and the proximity to free margins a dorsal nasal flap was deemed most appropriate for complete closure of the defect.  Using a sterile surgical marker, an appropriate flap was drawn incorporating the defect and placing the expected incisions within the relaxed skin tension lines where possible.    The area thus outlined was incised deep to adipose tissue with a #15 scalpel blade.  The skin margins were undermined to an appropriate distance in all directions utilizing iris scissors.

## 2024-10-23 NOTE — CONSULT NOTE ADULT - ASSESSMENT
A 64 year old female with PMHx of Alzheimer's and PSHx of 3 CS presents with few days of non-bilious vomiting. CT scan non-contrast shows concerns of closed loop with lactate of 13.    Recommendations:  - Patient is intubated in the ED  - Resuscitation as needed  - Waiting for the family for GOC discussion  - Will continue to assess    B-Team  [id number]

## 2024-10-23 NOTE — ED PROVIDER NOTE - SHIFT CHANGE DETAILS
During shift change, nurse alerted team that patient's blood pressure dropped significantly.  Pt immediately examined and in respiratory failure.  JANNA performed with RSI and IVF and pressor resuscitation initiated with good response to BP.  Surgery team at bedside discussing plan with family.  Patient unlikely to survive surgical procedure for closed loop SBO in current condition, but likely fatal condition without surgery.  Current plan is to continue medical resuscitation with hope that condition stabilizes sufficiently to attempt life-saving surgery.

## 2024-10-23 NOTE — ED PROVIDER NOTE - OBJECTIVE STATEMENT
64 year old female with PMH of Alzheimer disease and HTN presents to the ED accompanied by EMS for evaluation of altered mental status and hypoglycemia. Per EMS, pt was found to be hypoglycemic on  field with blood sugar of 23, was given 2 amps of D50 with improved mental status but remains altered. EMS states family noted that pt had multiple episodes of non-bloody  non-bilious vomiting over the weekend and loss appetite for about 5 days refusing to eat at all. 64 year old female with PMH of Alzheimer disease and HTN presents to the ED accompanied by EMS for evaluation of altered mental status and hypoglycemia. Per EMS, pt was found to be hypoglycemic on  field with blood sugar of "low", was given 2 amps of D50 with improved mental status but remains altered. EMS states family noted that pt had multiple episodes of non-bloody  non-bilious vomiting over the weekend and loss appetite for about 5 days refusing to eat at all. 64 year old female with PMH of Alzheimer disease and HTN presents to the ED accompanied by EMS for evaluation of altered mental status and hypoglycemia. Per EMS, pt was found to be hypoglycemic on  field with blood sugar of "low", was given 2 amps of D50 with improved mental status but remains altered. EMS states family noted that pt had multiple episodes of non-bloody  non-bilious vomiting over the weekend and loss appetite for about 5 days refusing to eat at all.  Attending - Agree with above.  I evaluated patient myself. 63 y/o F BIBA from home after 911 called.  Patient found to have low FS and medics gave glucagon and then D10 via IO (unable to get IV access in the field).  Now  at bedside.  Reports patient has hx of Alzheimers and Parkinsons disease.  Nonverbal with tremor at times.  Baseline is able to ambulate.   states that she is at baseline mental status now.  Reports she vomited on Sunday night and since has not eaten.  No further vomiting.  Has not received any medications since Sunday.  no known trauma or fever.   states that patient does not have advanced directives such as DNR/DNI and to be full code status.

## 2024-10-23 NOTE — ED ADULT NURSE NOTE - NSFALLRISKINTERV_ED_ALL_ED
Assistance OOB with selected safe patient handling equipment if applicable/Assistance with ambulation/Communicate fall risk and risk factors to all staff, patient, and family/Monitor gait and stability/Monitor for mental status changes and reorient to person, place, and time, as needed/Provide visual cue: yellow wristband, yellow gown, etc/Reinforce activity limits and safety measures with patient and family/Toileting schedule using arm’s reach rule for commode and bathroom/Use of alarms - bed, stretcher, chair and/or video monitoring/Call bell, personal items and telephone in reach/Instruct patient to call for assistance before getting out of bed/chair/stretcher/Non-slip footwear applied when patient is off stretcher/[place] to call system/Physically safe environment - no spills, clutter or unnecessary equipment/Purposeful Proactive Rounding/Room/bathroom lighting operational, light cord in reach

## 2024-10-23 NOTE — H&P ADULT - NSHPPHYSICALEXAM_GEN_ALL_CORE
T(C): 37.4 (10-23-24 @ 14:00), Max: 37.9 (10-23-24 @ 12:52)  HR: 137 (10-23-24 @ 19:15) (100 - 143)  BP: 137/90 (10-23-24 @ 19:15) (60/32 - 137/90)  RR: 20 (10-23-24 @ 19:15) (18 - 40)  SpO2: 96% (10-23-24 @ 16:20) (96% - 100%)    CONSTITUTIONAL: Well groomed, no apparent distress  EYES: PERRLA and symmetric, EOMI, No conjunctival or scleral injection, non-icteric  ENMT: Oral mucosa with dry. Normal dentition; no pharyngeal injection or exudates. Intubated   NECK: Supple, symmetric and without tracheal deviation   RESP: Intubated, clear breath sounds b/l   CV: tachycardia, +S1S2, no MRG; no JVD;  GI: distended, rigid without audible bowel sounds  LYMPH: No cervical LAD or tenderness; no axillary LAD or tenderness; no inguinal LAD or tenderness  MSK: Unable to assess. Mottled cold extremities with delayed capillary refill.   SKIN: No rashes or ulcers noted; no subcutaneous nodules or induration palpable  NEURO: Unable to assess   PSYCH: Sedated on mechanical ventilation

## 2024-10-23 NOTE — PATIENT PROFILE ADULT - FALL HARM RISK - HARM RISK INTERVENTIONS
Assistance with ambulation/Assistance OOB with selected safe patient handling equipment/Communicate Risk of Fall with Harm to all staff/Reinforce activity limits and safety measures with patient and family/Tailored Fall Risk Interventions/Visual Cue: Yellow wristband and red socks/Bed in lowest position, wheels locked, appropriate side rails in place/Call bell, personal items and telephone in reach/Instruct patient to call for assistance before getting out of bed or chair/Non-slip footwear when patient is out of bed/[place] to call system/Physically safe environment - no spills, clutter or unnecessary equipment/Purposeful Proactive Rounding/Room/bathroom lighting operational, light cord in reach/Unable to comprehend

## 2024-10-23 NOTE — PATIENT PROFILE ADULT - IS THERE A SUSPICION OF ABUSE/NEGLIGENCE?
[place] NENO    
no

## 2024-10-23 NOTE — ED ADULT NURSE REASSESSMENT NOTE - NS ED NURSE REASSESS COMMENT FT1
Pt noted to be hypotensive, MD was at bedside. Pt was intubated and placed on vent. Tolerated well. ICU float nurse with patient at this time.

## 2024-10-23 NOTE — CONSULT NOTE ADULT - SUBJECTIVE AND OBJECTIVE BOX
GENERAL SURGERY CONSULT NOTE  --------------------------------------------------------------------------------------------    Patient is a 64y old  Female who presents with a chief complaint of Vomiting     HPI: HPI:   64 year old female with PMH of Alzheimer disease and HTN and PSHx of 3 CS presents to the ED accompanied by EMS for evaluation of altered mental status and hypoglycemia. Per EMS, pt was found to be hypoglycemic on  field with blood sugar of "low", was given 2 amps of D50 with improved mental status but remains altered. EMS states family noted that pt had multiple episodes of non-bloody  non-bilious vomiting over the weekend and loss appetite for about 5 days refusing to eat at all.    ROS: 10-system review is otherwise negative except HPI above.      PAST MEDICAL & SURGICAL HISTORY:  Alzheimer disease        FAMILY HISTORY:      SOCIAL HISTORY:      ALLERGIES: No Known Allergies      HOME MEDICATIONS:     CURRENT MEDICATIONS  MEDICATIONS (STANDING): norepinephrine Infusion 0.05 MICROgram(s)/kG/Min IV Continuous <Continuous>  sodium chloride 0.9% Bolus 1000 milliLiter(s) IV Bolus once    MEDICATIONS (PRN):  --------------------------------------------------------------------------------------------    Vitals:   T(C): 37.4 (10-23-24 @ 14:00), Max: 37.9 (10-23-24 @ 12:52)  HR: 108 (10-23-24 @ 13:45) (107 - 113)  BP: 105/73 (10-23-24 @ 13:45) (102/80 - 130/100)  RR: 24 (10-23-24 @ 13:45) (18 - 40)  SpO2: 98% (10-23-24 @ 13:45) (98% - 100%)  CAPILLARY BLOOD GLUCOSE      POCT Blood Glucose.: 68 mg/dL (23 Oct 2024 16:29)  POCT Blood Glucose.: 139 mg/dL (23 Oct 2024 13:18)  POCT Blood Glucose.: 181 mg/dL (23 Oct 2024 11:51)    CAPILLARY BLOOD GLUCOSE      POCT Blood Glucose.: 68 mg/dL (23 Oct 2024 16:29)  POCT Blood Glucose.: 139 mg/dL (23 Oct 2024 13:18)  POCT Blood Glucose.: 181 mg/dL (23 Oct 2024 11:51)          PHYSICAL EXAM:   General: Altered mental status  Neuro: A+Ox0  HEENT: NC/AT  Neck: Soft, supple  Cardio: RRR  Resp: Intubated   GI/Abd: Soft, unable to assess tenderness /Distended,     --------------------------------------------------------------------------------------------    LABS  CBC (10-23 @ 12:05)                              16.4[H]                         11.86[H]  )----------------(  238        84.6[H]% Neutrophils, 6.2[L]% Lymphocytes, ANC: 10.03[H]                              53.9[H]    BMP (10-23 @ 12:44)             144     |  108[H]  |  70[H] 		Ca++ --      Ca 9.0                ---------------------------------( 202[H]		Mg --                 5.3     |  <7[LL]  |  3.03[H]			Ph --        LFTs (10-23 @ 12:44)      TPro 5.5[L] / Alb 2.4[L] / TBili 1.2 / DBili -- / [H] / ALT 44[H] / AlkPhos 75    Coags (10-23 @ 12:20)  aPTT 26.4 / INR 1.14 / PT 13.6[H]        VBG (10-23 @ 14:00)     7.13[L] / 53[H] / 37 / 18[L] / -11.9[L] / 48.9[L]%     Lactate: 9.3[HH]  VBG (10-23 @ 12:05)     7.07[L] / 55[H] / 46[H] / 16[L] / -14.8[L] / 55.7[L]%     Lactate: 13.3[HH]    --------------------------------------------------------------------------------------------    MICROBIOLOGY  Urinalysis (10-23 @ 14:00):     Color: Dark Yellow / Appearance: Turbid[!] / S.024 / pH: 5.0 / Gluc: Negative / Ketones: Trace[!] / Bili: Small[!] / Urobili: 1.0 / Protein :100[!] / Nitrites: Negative / Leuk.Est: Trace[!] / RBC: 10[H] / WBC: 5 / Sq Epi:  / Non Sq Epi: 57[H] / Bacteria Moderate[!]         --------------------------------------------------------------------------------------------    IMAGING    Significantly limited noncontrast study. Multiple dilated loops of small bowel with concern for closed loop small bowel obstruction. Difficulty delineating small bowel wall on this noncontrast CT, cannot exclude bowel ischemia. Surgical consultation recommended. Consider repeat

## 2024-10-23 NOTE — H&P ADULT - HISTORY OF PRESENT ILLNESS
64 year old female with PMH of Alzheimer disease and HTN presents to the ED accompanied by EMS for evaluation of altered mental status and hypoglycemia. Per EMS, pt was found to be hypoglycemic on  field with blood sugar of "low", was given 2 amps of D50 with improved mental status but remains altered. EMS states family noted that pt had multiple episodes of non-bloody  non-bilious vomiting over the weekend and loss appetite for about 5 days refusing to eat at all. 64 year old female with PMH of Alzheimer disease (baseline AOx0, ambulatory) and HTN presents to the ED accompanied by EMS for evaluation of altered mental status and hypoglycemia. Per EMS, pt was found to be hypoglycemic on  field with blood sugar of "low", was given 2 amps of D50 with improved mental status but remains altered. EMS states family noted that pt had multiple episodes of non-bloody  non-bilious vomiting over the weekend and loss appetite for about 5 days refusing to eat at all.    Upon arrival to the ED, pt afebrile, tachycardia 120s and hypotensive with SBP in 60s. Patient received 2L of fluid and started on levophed 0.1 and intubated for airway protection. s/p 1x Vanc and Zosyn. Lactic down trend from 13->9 with fluid resuscitation. [place], persistent tachy/hypotensive warrant 2L of additional IVF. POCUS showed small diameter IVC. Additionally, CT A/P showed closed loop small bowel obstruction, despite non-con obtain for JACKIE, finding also concerning for ischemic bowel with possible hemoperitoneum given moderate volume ascites. Patient is a 64 year old female with PMH of Alzheimer disease (baseline AOx0, ambulatory) and HTN who presents to the ED accompanied by EMS for evaluation of altered mental status and hypoglycemia. Per EMS, pt was found to be hypoglycemic on  field with blood sugar of "low", was given 2 amps of D50 with improved mental status but remains altered. EMS states family noted that pt had multiple episodes of non-bloody  non-bilious vomiting over the weekend and loss appetite for about 5 days refusing to eat at all.    Upon arrival to the ED, pt afebrile, tachycardia 120s and hypotensive with SBP in 60s. Labs notable for SCr 3.03, VBG 7/26/260/10, lactate 13.3. Patient received 2L of fluid and started on levophed 0.1. Intubated for airway protection. Abdomen rigid on exam, CTAP noncon due to JACKIE, obtained for further evaluation. Study revealed a closed loop small bowel obstruction, limited eval given noncon study however concerning for ischemic bowel with possible hemoperitoneum given moderate volume ascites. S/p 1x Vanc and Zosyn. Lactic down trended from 13->9 with fluid resuscitation. Despite interventions, persistent tachy/hypotensive warranting 2L of additional IVF. Bedside POCUS showed small diameter IVC.     Seen at bedside accompanied by sister and spouse. Patient sedated and unresponsive on exam, abdomen markedly distended and rigid, mottled extremities. Prognosis discussed at length with family. Spouse explained that patient had stated preference for no invasive surgical interventions previous to cognitive decline. After extensive conversation with spouse, patient is now DNR. Family remains hopeful regarding meaningful recovery with surgical intervention when optimized however understand guarded prognosis.

## 2024-10-23 NOTE — ED PROVIDER NOTE - PHYSICAL EXAMINATION
ATTENDING PHYSICAL EXAM  GEN - Lying on stretcher.  Opens eyes.  No response to commands.  HEAD - NC/AT; Right pupil clouded and NR.  Left pupil reactive.  NECK: Neck supple, non-tender without lymphadenopathy, no masses, no JVD  PULMONARY - CTA b/l, symmetric breath sounds, no w/r/r  CARDIAC -s1s2, tachy, no M,R,G  ABDOMEN - + no BSs.  Rigid with apparent tenderness throughout (patient groans with palpation).  + tympanic.  EXTREMITIES - No edema.

## 2024-10-23 NOTE — ED PROVIDER NOTE - NSTIMEPROVIDERCAREINITIATE_GEN_ER
Provider response: She was on lipitor 20 mg in past-I sent in the 10 mg dose as the lowest dose possible is safer for her given her history of cancer   Please let her know and we should follow her labs closely back on this med   Appt in may     Detailed v/m left for patient notifying of provider message & needing to follow up in May.    23-Oct-2024 11:58

## 2024-10-23 NOTE — H&P ADULT - NSHPRISKPAPSMEAR_GEN_A_CORE
Chief Complaint   Patient presents with    Follow-Up     F/v Dx: Coronary artery disease of native artery of native heart with stable angina pectoris (HCC)    Hyperlipidemia    MI (Non ST Segment Elevation MI)       Subjective     Yadiel Moody is a 68 y.o. male who presents today for follow up of coronary artery disease with prior coronary artery bypass graft surgery.    Since the patient's last visit on 10/17/23, he has been doing well clinically from cardiac standpoint. He admits to mild chest wall tenderness. He denies fatigue, chest pain, shortness of breath, palpitations, lower extremity edema, dizziness or syncope. He keeps active exercising at the gym with a ..    Past Medical History:   Diagnosis Date    Allergic rhinitis     Anesthesia     slow to wake up; ?apneic, needs encourangement to breathe    Back pain     Bowel habit changes 09/07/2023    diarrhea at times r/t pancreatitis    Cataract     minimal; NOT surgically corrected    Chickenpox     history of    [place] measles     history of    Heart burn     High cholesterol     medicated    Hx of lithotripsy     Hyperlipidemia     Hypertension     medicated    Influenza     history of    Mumps     history of    Myocardial infarct (HCC) 10/2022    x 3    Nasal drainage     Obesity     Pancreatitis 10/09/2022    Psychiatric problem 09/07/2023    PTSD- not medicated    Pulmonary hypertension (HCC)     Sleep apnea 2015    Uses Bi-pap    Snoring     sleep study done    Tonsillitis     history of    Urinary urgency 09/07/2023     Past Surgical History:   Procedure Laterality Date    MULTIPLE CORONARY ARTERY BYPASS ENDO VEIN HARVEST  9/15/2023    Procedure: CORONARY ARTERY BYPASS GRAFTING X3, ENDOSCOPIC VEIN HARVEST, TRANSESOPHAGEAL ECHOCARDIOGRAM;  Surgeon: Lizet Mann M.D.;  Location: SURGERY [facility];  Service: Cardiac    ECHOCARDIOGRAM, TRANSESOPHAGEAL, INTRAOPERATIVE  9/15/2023    Procedure: ECHOCARDIOGRAM, TRANSESOPHAGEAL,  INTRAOPERATIVE;  Surgeon: Lizet Mann M.D.;  Location: SURGERY [facility];  Service: Cardiac    UMBILICAL HERNIA REPAIR  02/23/2015    Performed by John H Ganser, M.D. at SURGERY [facility] ORS    OTHER ORTHOPEDIC SURGERY Right 2009    Right Knee Repair    OTHER ORTHOPEDIC SURGERY Right 1988    Right Meniscus    [id number]      NASAL RECONSTRUCTION      deviatum septum, sinus surgery around 1998,2013    TONSILLECTOMY      US GASTROINTESTIONAL ENDOSCOPIC      multiple for pancreatitis     Family History   Problem Relation Age of Onset    Heart Attack Father     Heart Disease Father     Sleep Apnea Father      Social History     Socioeconomic History    Marital status:      Spouse name: Not on file    Number of children: Not on file    Years of education: Not on file    Highest education level: Not on file   Occupational History    Not on file   Tobacco Use    Smoking status: Never     Passive exposure: Never    Smokeless tobacco: Never   Vaping Use    Vaping status: Never Used   Substance and Sexual Activity    Alcohol use: Not Currently    Drug use: No    Sexual activity: Not on file   Other Topics Concern    Not on file   Social History Narrative    Not on file     Social Determinants of Health     Financial Resource Strain: Not on file   Food Insecurity: Not on file   Transportation Needs: Not on file   Physical Activity: Not on file   Stress: Not on file   Social Connections: Not on file   Intimate Partner Violence: Not on file   Housing Stability: Not on file     Allergies   Allergen Reactions    Trulicity [Dulaglutide]      Pain on pancreas with 0.75mg dose     (Medications reviewed.)  Outpatient Encounter Medications as of 6/20/2024   Medication Sig Dispense Refill    multivitamin Tab Take 1 Tablet by mouth every day.      Ascorbic Acid (VITAMIN C PO) Take  by mouth every day.      Continuous Glucose Sensor (FREESTYLE JERRY 3 SENSOR) Misc Apply 1 Each topically every 14 days. 2 Each 11     atorvastatin (LIPITOR) 40 MG Tab Take 1 Tablet by mouth every evening. 90 Tablet 1    metoprolol tartrate (LOPRESSOR) 25 MG Tab Take 1 Tablet by mouth 2 times a day. 180 Tablet 1    FARXIGA 10 MG Tab TAKE 1 TABLET BY MOUTH EVERY  Tablet 1    metFORMIN ER (GLUCOPHAGE XR) 500 MG TABLET SR 24 HR Take 2 Tablets by mouth 2 times a day. 360 Tablet 1    Insulin Pen Needle 32 G x 4 mm Use one pen needle in pen device to inject insulin daily 100 Each 3    Blood Glucose Test Strips Use one One Touch Verio strip to test blood sugar twice daily. 200 Strip 3    Semaglutide,0.25 or 0.5MG/DOS, (OZEMPIC, 0.25 OR 0.5 MG/DOSE,) 2 MG/3ML Solution Pen-injector Inject 0.25 mg under the skin every 7 days. 3 mL 1    aspirin 81 MG EC tablet Take 1 Tablet by mouth every day. 30 Tablet 2    insulin glargine (LANTUS SOLOSTAR) 100 UNIT/ML Solution Pen-injector injection Inject 15 Units under the skin every evening. 3 mL 5    Blood Glucose Monitoring Suppl (BLOOD GLUCOSE MONITOR SYSTEM) w/Device Kit Test blood sugar as recommended by provider. 1 Kit 0    Lancets Use one lancet to test blood sugar three times daily before meals. 100 Each 0    Alcohol Swabs Wipe site with prep pad prior to injection. 100 Each 0    cetirizine (ZYRTEC) 10 MG Tab Take 10 mg by mouth every day.      acetaminophen (TYLENOL) 500 MG Tab Take 1,000 mg by mouth 1 time a day as needed for Mild Pain.      tamsulosin (FLOMAX) 0.4 MG capsule Take 0.4 mg by mouth 1/2 hour after breakfast.      Pancrelipase, Lip-Prot-Amyl, (CREON) 05006-492014 units Cap DR Particles Take 2 Tablets by mouth in the morning, at noon, and at bedtime.      omeprazole (PRILOSEC) 20 MG delayed-release capsule Take 20 mg by mouth every day.      sertraline (ZOLOFT) 50 MG Tab Take 50 mg by mouth every day. (Patient not taking: Reported on 6/20/2024)      [DISCONTINUED] clopidogrel (PLAVIX) 75 MG Tab Take 1 Tablet by mouth every day. (Patient not taking: Reported on 6/20/2024) 30 Tablet 2     No  "facility-administered encounter medications on file as of 6/20/2024.     Review of Systems   Constitutional: Negative.  Negative for chills, fever, malaise/fatigue and weight loss.   HENT: Negative.  Negative for hearing loss.    Eyes: Negative.  Negative for blurred vision and double vision.   Respiratory: Negative.  Negative for cough and shortness of breath.    Cardiovascular: Negative.  Negative for chest pain, palpitations, claudication and leg swelling.   Gastrointestinal: Negative.  Negative for abdominal pain, nausea and vomiting.   Genitourinary: Negative.  Negative for dysuria and urgency.   Musculoskeletal: Negative.  Negative for joint pain and myalgias.   Skin: Negative.  Negative for itching and rash.   Neurological: Negative.  Negative for dizziness, focal weakness, weakness and headaches.   Endo/Heme/Allergies: Negative.  Does not bruise/bleed easily.   Psychiatric/Behavioral: Negative.  Negative for depression. The patient is not nervous/anxious.               Objective     /70 (BP Location: Left arm, Patient Position: Sitting, BP Cuff Size: Adult)   Pulse (!) 54   Resp 16   Ht 1.753 m (5' 9\")   Wt 106 kg (233 lb)   SpO2 94%   BMI 34.41 kg/m²     Physical Exam  Constitutional:       Appearance: Normal appearance. He is well-developed and normal weight.   HENT:      Head: Normocephalic and atraumatic.   Neck:      Vascular: No JVD.   Cardiovascular:      Rate and Rhythm: Normal rate and regular rhythm.      Heart sounds: Normal heart sounds.   Pulmonary:      Effort: Pulmonary effort is normal.      Breath sounds: Normal breath sounds.   Abdominal:      General: Bowel sounds are normal.      Palpations: Abdomen is soft.      Comments: No hepatosplenomegaly.   Musculoskeletal:         General: Normal range of motion.   Lymphadenopathy:      Cervical: No cervical adenopathy.   Skin:     General: Skin is warm and dry.   Neurological:      Mental Status: He is alert and oriented to person, " place, and time.            CARDIAC STUDIES/PROCEDURES:     CARDIAC CATHETERIZATION CONCLUSIONS by Vazquez Romero  (08/22/23)  1.  Severe multivessel CAD  2.  Normal resting LVEDP 9 mmHg with mild transaortic gradient on pullback < 20mmHg     CARDIAC CATHETERIZATION CONCLUSIONS [facility] (11/11/22)  Cardiac catheterization showing severe 3 vessel coronary artery disease.     CAROTID ULTRASOUND (09/01/23)  Bilateral.   Mild plaque of the carotid bifurcation. Doppler velocities are normal   throughout the carotid arteries without evidence of hemodynamically   significant stenosis.    CT OF ABDOMEN [facility](03/08/23)  Vasculature: Non-aneurysmal abdominal aorta.     ECHOCARDIOGRAM CONCLUSIONS (01/04/24)  Prior study on 8/17/2023, compared to the report of the prior study,   there has been no significant change.   Normal left ventricular systolic function.  The left ventricular ejection fraction is visually estimated to be 60%.  Mild tricuspid regurgitation.  Estimated right ventricular systolic pressure is 20 mmHg.  (study result reviewed)      ECHOCARDIOGRAM CONCLUSIONS (08/17/23)  Prior study on 1/19/23, compared to the report of the prior study,   there has been no significant change.   Normal left ventricular systolic function.  The left ventricular ejection fraction is visually estimated to be 65%.  No significant valvular abnormalities.     ECHOCARDIOGRAM CONCLUSIONS [facility] (11/10/22)  Echocardiogram showing ejection fraction of 35-40%..     ECHOCARDIOGRAM CONCLUSIONS (01/19/13)  Diastolic dysfunction. Otherwise, structurally normal heart by surface echocardiogram.   Ejection fraction of 60-65%.     EKG performed on (09/17/23) EKG personally interpreted shows sinus rhythm.   EKG performed on (05/22/23) EKG shows sinus rhythm.   EKG performed on (12/28/12) EKG shows normal sinus rhythm with early R/S transition and Q waves of lead III.      Laboratory results of (12/28/23) were reviewed. Cholesterol profile of 129/141/45/56 mg/dL noted.   Laboratory results of (10/28/20) Cholesterol profile of 246/347/38/139 mg/dL noted.   Laboratory results of (01/18/13) Cholesterol profile of 202/276/41/106 noted.     MPI CONCLUSIONS (01/19/13)  1. No fixed or reversible left ventricular perfusion defects.  2. Normal LV wall motion. Ejection fraction is 62%.       Assessment & Plan     1. Coronary artery disease of native artery of native heart with stable angina pectoris (HCC)        2. Hx of CABG        3. Primary hypertension        4. Pure hypercholesterolemia        5. Bilateral carotid artery stenosis            Medical Decision Making: Today's Assessment/Status/Plan:        Coronary artery disease with prior coronary bypass graft (x3 left internal mammary artery to left anterior descending artery saphenous vein graft to obtuse marginal artery, saphenous vein graft to distal right coronary artery by Lizet Robbins 09/15/23): She is clinically doing well. I will continue with current medical care including aspirin, metoprolol, atorvastatin.  Hypertension: Blood pressure is well controlled. We will continue with beta blockade therapy  Hyperlipidemia: He is doing well on statin therapy without myalgia symptoms.  Carotid artery stenosis: Clinically stable on above medical therapy. .   Health maintenance: He was diagnosed with necrotizing pancreatitis while visiting his grandchildren in [place], transferring to [place] and ultimately at [facility] in [place] in 10/09/22-03/19/23. (Managed by [facility]), NSTEMI (10/09/22 with hemorrhagic necrotizing pancreatitis with E. Coli sepsis),  Additional information: He and his wife lives in [place], [place].    We will follow up the patient in one year.    CC Trent Wilson              yes

## 2024-10-24 NOTE — DISCHARGE NOTE FOR THE EXPIRED PATIENT - HOSPITAL COURSE
64 year old female with PMH of Alzheimer disease (baseline AOx0, ambulatory) and HTN who presents to the ED accompanied by EMS for evaluation of altered mental status and hypoglycemia. Per EMS, pt was found to be hypoglycemic on  field with blood sugar of "low", was given 2 amps of D50 with improved mental status but remains altered. EMS states family noted that pt had multiple episodes of non-bloody, non-bilious vomiting over the weekend and loss appetite for about 5 days refusing to eat at all.    Upon arrival to the ED, pt afebrile, tachycardia 120s and hypotensive with SBP in 60s. Labs notable for SCr 3.03, VBG 7/26/260/10, lactate 13.3. Patient received 2L of fluid and started on levophed 0.1. Intubated for airway protection. Abdomen rigid on exam, CTAP noncon due to JACKIE, obtained for further evaluation. Study revealed a closed loop small bowel obstruction, limited eval given noncon study however concerning for ischemic bowel with possible hemoperitoneum given moderate volume ascites. S/p 1x Vanc and Zosyn. Lactic down trended from 13->9 with fluid resuscitation. Despite interventions, persistent tachy/hypotensive warranting 2L of additional IVF as bedside POCUS showed small diameter IVC.     Seen at bedside accompanied by sister and spouse. Patient sedated and unresponsive on exam, abdomen markedly distended and rigid, mottled extremities. Prognosis discussed at length with family. Spouse explained that patient had stated preference for no invasive surgical interventions previous to cognitive decline. Patient made DNR after extensive conversation with spouse. Patient brought up from ED on levophed 0.8mcg/kg/min and propofol 15mcg/kg/min. Noted by RN to have L arm shaking and propofol increased to 25mcg/kg/min, which caused shaking to stop. BP dropped and levophed uptitrated to 1mcg/kg/min. Had discussion with family at bedside about patient's poor prognosis given the fact that she is not a surgical candidate and was not tolerating high doses of pressors with her tachyarrhythmias. Informed family that the next step would be to place a central line and A-line due to increasing pressor requirements and family stated that they did not want those interventions and that they wanted the patient to be comfortable. Per family, priority is patient's comfort and they did not want further lab draws. Levophed capped at 1mcg/kg/min and propofol to remain at 25mcg/kg/min. PRN medications added for patient's comfort. Family present at bedside and called  to come for last rites. At 12:57am, called to evaluate the patient for unresponsiveness. On physical exam, patient did not respond to verbal or physical stimuli. No spontaneous respirations. Absent heart and breath sounds. Absent radial, femoral, and carotid pulses. Pupils are fixed and dilated absent BRANDON, no corneal reflex.  EKG rhythm strip shows asystole. Patient pronounced dead at 12:57am. Attending notified. Family present at bedside.

## 2024-10-24 NOTE — CHART NOTE - NSCHARTNOTEFT_GEN_A_CORE
Called to evaluate the patient for unresponsiveness.     On physical exam, patient did not respond to verbal or physical stimuli. No spontaneous respirations. Absent heart and breath sounds. Absent radial, femoral, and carotid pulses. Pupils are fixed and dilated absent BRANDON, no corneal reflex.  EKG rhythm strip shows asystole.   Patient pronounced dead at 12:57am. Attending notified.  Family present at bedside.

## 2024-10-24 NOTE — INITIAL ORGAN DONATION REFERRAL - NSORGANDONATIONCLINICALTRIGGER_GEN_ALL_CORE
Absence of TWO or more brain stem reflexes/[place] Coma Scale is less than or equal to 5/Family discussion withdrawal of life-sustaining therapies is anticipated

## 2024-10-25 LAB
-  BLOOD PCR PANEL: SIGNIFICANT CHANGE UP
CULTURE RESULTS: ABNORMAL
GRAM STN FLD: ABNORMAL
GRAM STN FLD: ABNORMAL
METHOD TYPE: SIGNIFICANT CHANGE UP
ORGANISM # SPEC MICROSCOPIC CNT: ABNORMAL
ORGANISM # SPEC MICROSCOPIC CNT: ABNORMAL
SPECIMEN SOURCE: SIGNIFICANT CHANGE UP

## 2024-10-26 LAB
CULTURE RESULTS: ABNORMAL
SPECIMEN SOURCE: SIGNIFICANT CHANGE UP
